# Patient Record
Sex: MALE | Race: WHITE | NOT HISPANIC OR LATINO | Employment: STUDENT | URBAN - METROPOLITAN AREA
[De-identification: names, ages, dates, MRNs, and addresses within clinical notes are randomized per-mention and may not be internally consistent; named-entity substitution may affect disease eponyms.]

---
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
## 2021-05-10 ENCOUNTER — IMMUNIZATION (OUTPATIENT)
Dept: FAMILY MEDICINE | Facility: CLINIC | Age: 21
End: 2021-05-10
Payer: COMMERCIAL

## 2021-05-10 PROCEDURE — 91301 PR COVID VAC MODERNA 100 MCG/0.5 ML IM: CPT

## 2021-05-10 PROCEDURE — 0011A PR COVID VAC MODERNA 100 MCG/0.5 ML IM: CPT

## 2021-06-02 VITALS — BODY MASS INDEX: 20.92 KG/M2 | WEIGHT: 138 LBS | HEIGHT: 68 IN

## 2021-06-07 ENCOUNTER — IMMUNIZATION (OUTPATIENT)
Dept: FAMILY MEDICINE | Facility: CLINIC | Age: 21
End: 2021-06-07
Attending: FAMILY MEDICINE
Payer: COMMERCIAL

## 2021-06-07 PROCEDURE — 0012A PR COVID VAC MODERNA 100 MCG/0.5 ML IM: CPT

## 2021-06-07 PROCEDURE — 91301 PR COVID VAC MODERNA 100 MCG/0.5 ML IM: CPT

## 2021-06-08 NOTE — TELEPHONE ENCOUNTER
"RN Triage:    Injured left elbow at work today.  Hit elbow hard on a post.  At time of injury, he felt a momentary surge of pain on his \"funny bone\" that radiated down the arm.  Has full range of motion now but hurts to 5/10 on pain scale.  Right side of left palm and left pinkie are numb.  Slight bruising.  No swelling.  Is a .  Writer advised evaluation today per guideline.  Mother unsure where she will take him.    Kerri Benitez RN  [facility]                      "

## 2021-06-21 NOTE — PROGRESS NOTES
[facility] Well Child Check    ASSESSMENT & PLAN  Tj Lindo is a 18  y.o. 0  m.o. who has normal growth and normal development.    Diagnoses and all orders for this visit:    Encounter for WCC (well child check) with abnormal findings  -     Influenza, Seasonal,Quad Inj, 36+ MOS  -     Meningococcal MCV4P      Return to clinic in 1 year for a Well Child Check or sooner as needed    IMMUNIZATIONS/LABS  Immunizations were reviewed and orders were placed as appropriate.    REFERRALS  Dental:  Recommend routine dental care as appropriate.  Other:  No additional referrals were made at this time.    ANTICIPATORY GUIDANCE  I have reviewed age appropriate anticipatory guidance.    HEALTH HISTORY  Do you have any concerns that you'd like to discuss today?: Acne      Roomed by: Angeles PICHARDO, NILSON    Refills needed? No    Do you have any forms that need to be filled out? No        Do you have any significant health concerns in your family history?: No  No family history on file.  Since your last visit, have there been any major changes in your family, such as a move, job change, separation, divorce, or death in the family?: No  Has a lack of transportation kept you from medical appointments?: No    Home  Who lives in your home?:  Mom, Dad & Sister  Social History     Social History Narrative     Do you have any concerns about losing your housing?: No  Is your housing safe and comfortable?: Yes  Do you have any trouble with sleep?:  No    Education  What school do you child attend?:  [employer]   What grade are you in?:  11th  How do you perform in school (grades, behavior, attention, homework?: Good     Eating  Do you eat regular meals including fruits and vegetables?:  yes  What are you drinking (cow's milk, water, soda, juice, sports drinks, energy drinks, etc)?: cow's milk- 2%, water, soda, juice and coffee  Have you been worried that you don't have enough food?: No  Do you have concerns about your body or  "appearance?:  No    Activities  Do you have friends?:  yes  Do you get at least one hour of physical activity per day?:  yes  How many hours a day are you in front of a screen other than for schoolwork (computer, TV, phone)?:  2-3  What do you do for exercise?:  [employer]  Do you have interest/participate in community activities/volunteers/school sports?:  yes    MENTAL HEALTH SCREENING  No Data Recorded  No Data Recorded    VISION/HEARING  Vision: Completed. See Results  Hearing:  Completed. See Results     Hearing Screening    Method: Audiometry    125Hz 250Hz 500Hz 1000Hz 2000Hz 3000Hz 4000Hz 6000Hz 8000Hz   Right ear:   25 20 20  20     Left ear:   25 20 20  20        Visual Acuity Screening    Right eye Left eye Both eyes   Without correction: 20/20 20/20 20/20   With correction:      Comments: Plus Lens: Pass: blurring of vision with +2.50 lens glasses      TB Risk Assessment:  The patient and/or parent/guardian answer positive to:  patient and/or parent/guardian answer 'no' to all screening TB questions    Dyslipidemia Risk Screening  Have either of your parents or any of your grandparents had a stroke or heart attack before age 55?: No  Any parents with high cholesterol or currently taking medications to treat?: Possibly Father     Dental  When was the last time you saw the dentist?: 1-3 months ago   Parent/Guardian declines the fluoride varnish application today. Fluoride not applied today.    Patient Active Problem List   Diagnosis   (none) - all problems resolved or deleted       Drugs  Does the patient use tobacco/alcohol/drugs?:  no    Safety  Does the patient have any safety concerns (peer or home)?:  no  Does the patient use safety belts, helmets and other safety equipment?:  yes    Sex  Have you ever had sex?:  No    MEASUREMENTS  Height:  5' 8\" (1.727 m)  Weight: 138 lb (62.6 kg)  BMI: Body mass index is 20.98 kg/(m^2).  Blood Pressure: 118/72  Blood pressure percentiles are 54 % systolic and 66 " % diastolic based on the 2017 AAP Clinical Practice Guideline. Blood pressure percentile targets: 90: 131/81, 95: 135/84, 95 + 12 mmH/96.    PHYSICAL EXAM      General: Awake, Alert and Cooperative   Head: Normocephalic and Atraumatic   Eyes: PERRL and EOMI   ENT: Normal pearly TMs bilaterally and Oropharynx clear   Neck: Supple and Thyroid without enlargement or nodules   Chest: Chest wall normal   Lungs: Clear to auscultation bilaterally   Heart:: Regular rate and rhythm and no murmurs   Abdomen: Soft, nontender, nondistended and no hepatosplenomegaly   : Normal external male genitalia, circumcised, testes descended bilaterally   Spine: Spine straight without curvature noted    Musculoskeletal: Moving all extremities, Normal tone and Full range of motion of the extremities   Neuro: Alert and oriented times 3, Normal tone in upper and lower extremities and Grossly normal   Skin: No rashes or lesions noted

## 2023-10-15 ENCOUNTER — HEALTH MAINTENANCE LETTER (OUTPATIENT)
Age: 23
End: 2023-10-15